# Patient Record
Sex: MALE | Race: WHITE
[De-identification: names, ages, dates, MRNs, and addresses within clinical notes are randomized per-mention and may not be internally consistent; named-entity substitution may affect disease eponyms.]

---

## 2017-05-25 ENCOUNTER — HOSPITAL ENCOUNTER (OUTPATIENT)
Dept: HOSPITAL 62 - END | Age: 57
Discharge: HOME | End: 2017-05-25
Attending: SPECIALIST
Payer: OTHER GOVERNMENT

## 2017-05-25 VITALS — DIASTOLIC BLOOD PRESSURE: 84 MMHG | SYSTOLIC BLOOD PRESSURE: 131 MMHG

## 2017-05-25 DIAGNOSIS — D12.2: ICD-10-CM

## 2017-05-25 DIAGNOSIS — I10: ICD-10-CM

## 2017-05-25 DIAGNOSIS — Z79.82: ICD-10-CM

## 2017-05-25 DIAGNOSIS — K62.0: ICD-10-CM

## 2017-05-25 DIAGNOSIS — K57.30: ICD-10-CM

## 2017-05-25 DIAGNOSIS — Z79.899: ICD-10-CM

## 2017-05-25 DIAGNOSIS — Z12.11: Primary | ICD-10-CM

## 2017-05-25 DIAGNOSIS — Z86.718: ICD-10-CM

## 2017-05-25 PROCEDURE — 45380 COLONOSCOPY AND BIOPSY: CPT

## 2017-05-25 PROCEDURE — 0DBK8ZX EXCISION OF ASCENDING COLON, VIA NATURAL OR ARTIFICIAL OPENING ENDOSCOPIC, DIAGNOSTIC: ICD-10-PCS | Performed by: SPECIALIST

## 2017-05-25 PROCEDURE — 88305 TISSUE EXAM BY PATHOLOGIST: CPT

## 2017-05-25 RX ADMIN — MIDAZOLAM HYDROCHLORIDE ONE MG: 1 INJECTION, SOLUTION INTRAMUSCULAR; INTRAVENOUS at 09:30

## 2017-05-25 RX ADMIN — MIDAZOLAM HYDROCHLORIDE ONE MG: 1 INJECTION, SOLUTION INTRAMUSCULAR; INTRAVENOUS at 09:38

## 2017-05-25 RX ADMIN — FENTANYL CITRATE ONE MCG: 50 INJECTION INTRAMUSCULAR; INTRAVENOUS at 09:50

## 2017-05-25 RX ADMIN — FENTANYL CITRATE ONE MCG: 50 INJECTION INTRAMUSCULAR; INTRAVENOUS at 09:32

## 2017-05-25 RX ADMIN — FENTANYL CITRATE ONE MCG: 50 INJECTION INTRAMUSCULAR; INTRAVENOUS at 09:40

## 2017-05-25 NOTE — DISCHARGE SUMMARY E
Discharge Summary



NAME: CHAY VALENTIN

MRN:  [id number]        : 1960     AGE: 56Y

ADMITTED: 2017                  DISCHARGED: 2017



HOSPITAL COURSE:

The patient is 56, underwent colon screening.  It shows anal polyp in the

anal area, 2 mm, small to biopsy.  Close to the anal verge.  Needs to be

observed.  Sigmoid diverticulosis and question small polyp, 2 mm,

ascending colon.



DISCHARGE PLAN:

Awaiting biopsy results.  Consider age and followup colonoscopy in 1 year.







DICTATING PHYSICIAN:  KAYLA JACKSON M.D.





[id number]                  DT: 2017    1205

PHY#: [id number]            DD: 2017    1025

ID:   [id number]           JOB#: [id number]       ACCT: [id number]



cc:KAYLA JACKSON M.D.

>

## 2017-05-26 NOTE — OPERATIVE REPORT E
Operative Report



NAME: CHAY VALENTIN

MRN:  [id number]          : 1960 AGE:  56Y

DATE OF SURGERY:                         ROOM:



PREOPERATIVE DIAGNOSIS:

Colon screening.



POSTOPERATIVE DIAGNOSIS:

Diminutive small 2-mm polyp in the anal area, too small to biopsy, very

close to the anal verge.  This needs to be observed.  Followup colonoscopy

in 1 year pending biopsy results.



SURGEON:

KAYLA JACKSON M.D.



PROCEDURE:

1.   RECTUM:  Anal polyp 2 mm.

2.   Sigmoid descending colon diverticulosis.

3.   Transverse colon.

4.   Normal ascending colon shows possibility of small polyp versus prominent

     fold.  Biopsy obtained.

5.   Cecum, ascending colon, 2 mm benign-looking polyp.  Cecum orifice of the

     appendix was visualized and was normal.



Scope withdrawn.  Cecum, ascending, transverse, descending, sigmoid, all

the way to the rectum.



CONCLUSION:

1.   Question anal polyp at the verge, 2 mm.

2.   Sigmoid, descending colon diverticulosis.

3.   Question polyp, ascending colon.



DISCHARGE PLAN:

1.   Awaiting biopsy.

2.   Consider followup colonoscopy in 1 year.











DICTATING PHYSICIAN:  KAYLA JACKSON M.D.





[id number]                  DT: 2017    1453

PHY#: [id number]            DD: 2017    1024

ID:   [id number]           JOB#: [id number]       ACCT: [id number]



cc:KAYLA JACKSON M.D.

>

## 2018-06-13 ENCOUNTER — HOSPITAL ENCOUNTER (OUTPATIENT)
Dept: HOSPITAL 62 - HHS | Age: 58
End: 2018-06-13
Payer: OTHER GOVERNMENT

## 2018-06-13 DIAGNOSIS — Z12.83: Primary | ICD-10-CM
